# Patient Record
Sex: FEMALE | Race: WHITE | HISPANIC OR LATINO
[De-identification: names, ages, dates, MRNs, and addresses within clinical notes are randomized per-mention and may not be internally consistent; named-entity substitution may affect disease eponyms.]

---

## 2017-01-14 ENCOUNTER — TRANSCRIPTION ENCOUNTER (OUTPATIENT)
Age: 37
End: 2017-01-14

## 2017-04-28 ENCOUNTER — OUTPATIENT (OUTPATIENT)
Dept: OUTPATIENT SERVICES | Facility: HOSPITAL | Age: 37
LOS: 1 days | Discharge: HOME | End: 2017-04-28

## 2017-06-28 DIAGNOSIS — F33.1 MAJOR DEPRESSIVE DISORDER, RECURRENT, MODERATE: ICD-10-CM

## 2017-06-28 DIAGNOSIS — F90.2 ATTENTION-DEFICIT HYPERACTIVITY DISORDER, COMBINED TYPE: ICD-10-CM

## 2018-08-10 ENCOUNTER — OUTPATIENT (OUTPATIENT)
Dept: OUTPATIENT SERVICES | Facility: HOSPITAL | Age: 38
LOS: 1 days | Discharge: HOME | End: 2018-08-10

## 2018-08-10 DIAGNOSIS — D50.0 IRON DEFICIENCY ANEMIA SECONDARY TO BLOOD LOSS (CHRONIC): ICD-10-CM

## 2018-08-10 DIAGNOSIS — R53.83 OTHER FATIGUE: ICD-10-CM

## 2018-08-10 DIAGNOSIS — E78.5 HYPERLIPIDEMIA, UNSPECIFIED: ICD-10-CM

## 2018-08-10 DIAGNOSIS — E13.9 OTHER SPECIFIED DIABETES MELLITUS WITHOUT COMPLICATIONS: ICD-10-CM

## 2018-08-10 DIAGNOSIS — D55.1 ANEMIA DUE TO OTHER DISORDERS OF GLUTATHIONE METABOLISM: ICD-10-CM

## 2018-08-27 ENCOUNTER — OUTPATIENT (OUTPATIENT)
Dept: OUTPATIENT SERVICES | Facility: HOSPITAL | Age: 38
LOS: 1 days | Discharge: HOME | End: 2018-08-27

## 2018-08-27 DIAGNOSIS — C56.9 MALIGNANT NEOPLASM OF UNSPECIFIED OVARY: ICD-10-CM

## 2018-08-27 DIAGNOSIS — R97.1 ELEVATED CANCER ANTIGEN 125 [CA 125]: ICD-10-CM

## 2018-12-17 ENCOUNTER — TRANSCRIPTION ENCOUNTER (OUTPATIENT)
Age: 38
End: 2018-12-17

## 2019-01-27 ENCOUNTER — TRANSCRIPTION ENCOUNTER (OUTPATIENT)
Age: 39
End: 2019-01-27

## 2019-06-19 PROBLEM — Z00.00 ENCOUNTER FOR PREVENTIVE HEALTH EXAMINATION: Status: ACTIVE | Noted: 2019-06-19

## 2019-07-19 ENCOUNTER — APPOINTMENT (OUTPATIENT)
Dept: BREAST CENTER | Facility: CLINIC | Age: 39
End: 2019-07-19
Payer: COMMERCIAL

## 2019-07-19 VITALS
BODY MASS INDEX: 30.73 KG/M2 | SYSTOLIC BLOOD PRESSURE: 118 MMHG | TEMPERATURE: 98.5 F | WEIGHT: 180 LBS | HEIGHT: 64 IN | DIASTOLIC BLOOD PRESSURE: 80 MMHG

## 2019-07-19 DIAGNOSIS — Z86.59 PERSONAL HISTORY OF OTHER MENTAL AND BEHAVIORAL DISORDERS: ICD-10-CM

## 2019-07-19 DIAGNOSIS — R92.8 OTHER ABNORMAL AND INCONCLUSIVE FINDINGS ON DIAGNOSTIC IMAGING OF BREAST: ICD-10-CM

## 2019-07-19 DIAGNOSIS — Z87.09 PERSONAL HISTORY OF OTHER DISEASES OF THE RESPIRATORY SYSTEM: ICD-10-CM

## 2019-07-19 DIAGNOSIS — F17.200 NICOTINE DEPENDENCE, UNSPECIFIED, UNCOMPLICATED: ICD-10-CM

## 2019-07-19 DIAGNOSIS — Z80.41 FAMILY HISTORY OF MALIGNANT NEOPLASM OF OVARY: ICD-10-CM

## 2019-07-19 DIAGNOSIS — N60.82 OTHER BENIGN MAMMARY DYSPLASIAS OF LEFT BREAST: ICD-10-CM

## 2019-07-19 DIAGNOSIS — N60.81 OTHER BENIGN MAMMARY DYSPLASIAS OF RIGHT BREAST: ICD-10-CM

## 2019-07-19 PROCEDURE — 99203 OFFICE O/P NEW LOW 30 MIN: CPT

## 2019-07-21 PROBLEM — Z87.09 HISTORY OF ASTHMA: Status: RESOLVED | Noted: 2019-07-21 | Resolved: 2019-07-21

## 2019-07-21 PROBLEM — Z80.41 FAMILY HISTORY OF OVARIAN CANCER: Status: ACTIVE | Noted: 2019-07-21

## 2019-07-21 PROBLEM — F17.200 CURRENT SMOKER: Status: ACTIVE | Noted: 2019-07-21

## 2019-07-21 PROBLEM — Z86.59 HISTORY OF ANXIETY: Status: RESOLVED | Noted: 2019-07-21 | Resolved: 2019-07-21

## 2019-07-21 RX ORDER — CLONAZEPAM 0.5 MG/1
0.5 TABLET ORAL
Qty: 60 | Refills: 0 | Status: ACTIVE | COMMUNITY
Start: 2019-06-22

## 2019-07-21 RX ORDER — ESCITALOPRAM OXALATE 20 MG/1
20 TABLET ORAL
Qty: 90 | Refills: 0 | Status: ACTIVE | COMMUNITY
Start: 2019-06-22

## 2019-07-21 RX ORDER — ALBUTEROL SULFATE 2.5 MG/3ML
(2.5 MG/3ML) SOLUTION RESPIRATORY (INHALATION)
Refills: 0 | Status: ACTIVE | COMMUNITY

## 2019-08-23 NOTE — PHYSICAL EXAM
[Normocephalic] : normocephalic [Atraumatic] : atraumatic [EOMI] : extra ocular movement intact [No Supraclavicular Adenopathy] : no supraclavicular adenopathy [No Cervical Adenopathy] : no cervical adenopathy [No Nipple Retraction] : no left nipple retraction [No Nipple Discharge] : no left nipple discharge [No Rashes] : no rashes [No Ulceration] : no ulceration [Examined in the supine and seated position] : examined in the supine and seated position [No Axillary Lymphadenopathy] : no right axillary lymphadenopathy [Not Tender] : non-tender [Soft] : abdomen soft [No Edema] : no edema [de-identified] : no suspicious masses palpated in either breast, but she does have several sebaceous cysts on b/l breasts that do not appear infected or inflamed at this time  [de-identified] : three <5mm sebaceous cysts in the 9:00 axis, not infected or inflamed, no induration or erythema; no other suspicious breast masses palpated  [de-identified] : no suspicious masses palpated in her breast, but she has a 5mm sebaceous cyst on the medial portion of her breast

## 2019-08-23 NOTE — HISTORY OF PRESENT ILLNESS
[FreeTextEntry1] : Rosita is a 38 premenopausal F who presents with a recent benign right breast biopsy, left breast pain, and a family history of ovarian cancer. \par \par Starting about 2 months prior, she started to have intermittent pain in her retroareolar left breast.  This prompted the following work up:\par 19 -- b/l dx mammogram \par -scattered areas of fibroglandular density\par -small subcm asymmetry in lateral R breast, correlates as palpated \par -no significant masses, calcifications, or other findings in L breast \par b/l US \par -the palpable area correlates with a 3 mm sebaceous cyst within the dermis of the right breast @9N3, it demonstrates a tract extending to skin surface\par -subjacent to, a hypoechoic lesion is present, measuring 4mm --> complicated cystic lesion vs. nodule, rec BIOPSY \par BIRADS 4\par \par 19 -- R US CNBx @9N9\par -breast tissue with  chronic and granulomatous inflammation \par \par She has no other breast related complaints at this time.  She denies palpating any other masses, denies any signs of redness or pain in the area of her recent biopsy, and denies any nipple discharge or retraction.  \par \par HISTORICAL RISK FACTORS: \par -1 prior breast biopsy as above \par -family history of ovarian cancer in her sister, reportedly BRCA 1/2 negative; no family history of breast cancer \par -, age at first live birth was 25 \par -no prior OCP use \par \par

## 2019-08-23 NOTE — REVIEW OF SYSTEMS
[As Noted in HPI] : as noted in HPI [Skin Wound] : skin wound [Negative] : Heme/Lymph [Skin Lesions] : no skin lesions [Breast Pain] : no breast pain

## 2019-08-23 NOTE — CONSULT LETTER
[Dear  ___] : Dear  [unfilled], [Please see my note below.] : Please see my note below. [Consult Letter:] : I had the pleasure of evaluating your patient, [unfilled]. [Consult Closing:] : Thank you very much for allowing me to participate in the care of this patient.  If you have any questions, please do not hesitate to contact me. [Sincerely,] : Sincerely, [FreeTextEntry2] : Nelida De Los Santos MD\par [address]\[place] [address] [FreeTextEntry3] : Becky Rodriguez MD \par Breast Surgical Oncologist\par [facility] [place]\par [facility]\par [facility]

## 2019-08-23 NOTE — PAST MEDICAL HISTORY
[Menstruating] : The patient is menstruating [Menarche Age ____] : age at menarche was [unfilled] [Total Preg ___] : G[unfilled] [Live Births ___] : P[unfilled]  [Age At Live Birth ___] : Age at live birth: [unfilled] [History of Hormone Replacement Treatment] : has no history of hormone replacement treatment [FreeTextEntry5] : s/p IUD  [FreeTextEntry6] : denies [FreeTextEntry7] : IUD  [FreeTextEntry8] : yes x 3 months

## 2019-08-23 NOTE — DATA REVIEWED
[FreeTextEntry1] : Pathology Report Received From [facility]:\par \par The pathology report of the right breast ultrasound core biopsy dated 6/11/2019 indicated that the target at 9:00 is BENIGN\par \par DIAGNOSIS:\par \par 1. BREAST, RIGHT, 9 O'CLOCK, 9 CM FN: CORE BIOPSY\par - BREAST TISSUE WITH CHRONIC AND GRANULOMATOUS INFLAMMATION.\par \par The pathology results are concordant with the imaging findings. In addition surgical consultation is recommended.\par \par The benign results will be relayed to the patient by the mammography coordinator.\par \par Surgical consultation of the right breast(s) is recommended. _\par \par Electronic Signature: I personally reviewed the images and agree with this report. Final Report: Dictated by and Signed by Attending Ranjana Hummel MD 6/17/2019 2:34 PM\par *******END OF ADDENDUM******\par \par IMPRESSION:\par \par Ultrasound guided needle biopsy of the right breast. Pathology pending.\par \par A final report will be issued when Pathology results are available. _\par \par US BREAST CORE BIOPSY RIGHT, MAMMO DIGITAL POST PROCEDURE RIGHT\par \par HISTORY: Ultrasound of the right breast dated 5/9/2019 revealed a mass at 9:00 o'clock which was recommended for biopsy.\par \par Benefits, risks and alternatives to the procedure were explained to the patient and informed written consent was obtained.\par \par The patient denied taking aspirin or anticoagulants and stated no allergies to topical antiseptic solutions or local anesthetic.\par \par Utilizing universal protocol, site and patient verification was performed prior to starting the procedure.\par \par PROCEDURE: After sterile skin preparation, local anesthesia was achieved with 1% lidocaine. Under ultrasound guidance, a 12G vacuum assisted needle biopsy device was used to obtain multiple core specimens.\par \par After the procedure, a hourglass marking clip was deployed in the area of sampling.\par \par The patient tolerated the procedure well without immediate complications.\par \par POST PROCEDURE MAMMOGRAM FOR MARKER PLACEMENT\par \par A post-procedure mammogram was performed and demonstrates a clip in the appropriate location.\par \par Electronic Signature: I personally reviewed the images and agree with this report. Final Report: Dictated by and Signed by Attending Ranjana Hummel MD 6/11/2019 9:24 AM\par \par OVERALL IMPRESSION: BI-RADS 4: SUSPICIOUS\par 4 mm lesion in the right breast at 9:00 axis at 9 cm from nipple. Ultrasound-guided aspiration/possible biopsy is advised for further evaluation.\par \par The palpable area in the right breast correlates with a 3 mm sebaceous cyst within the dermis of the skin.\par \par \par The findings and recommendations were discussed with the patient.\par \par Biopsy of the right breast(s) is recommended. A letter will be sent to the patient to return for a biopsy.\par \par MAMMO TOMOSYNTHESIS DIAGNOSTIC BILATERAL, US BREAST\par \par Clinical Breast Exam: Patient does report clinical breast exam in the last year.\par \par Clinical Indication: No family history of breast cancer.\par \par Compared to: 03/27/2017\par \par MAMMOGRAM:\par \par Tomosynthesis and 2D imaging of the breast(s) were performed. Current study was also evaluated with a computer aided detection (CAD) system.\par \par Breast Density: There are scattered areas of fibroglandular density.\par \par There is a small subcentimeter asymmetry in the lateral aspect of the right breast which correlates as palpated. No significant masses, calcifications, or other findings are seen in the left breast.\par \par US BREAST LIMITED BILATERAL\par \par Color flow, Gray scale and real-time ultrasound of both breasts was performed and targeted to the area(s) of interest.\par \par The palpable area correlates with a 3 mm sebaceous cyst within the dermis of the right breast at 9:00 axis at 9 cm from nipple. It demonstrates a tract extending to the skin surface. Subjacent to, is a hypoechoic lesion measuring 4 mm. This is not palpable. This may represents a complicated cystic lesion versus nodule and is indeterminate.\par No suspicious abnormalities were seen sonographically in the left breast.\par \par Electronic Signature: I personally reviewed the images and agree with this report. Final Report: Dictated by and Signed by Attending Radha Martinez MD 5/9/2019 10:31 AM\par

## 2019-08-23 NOTE — ASSESSMENT
[FreeTextEntry1] : Rosita is a 38 premenopausal F with bilateral sebaceous cysts on her breasts, breast pain, and a recent benign R biopsy. \par \par ON exam, she had bilateral sebaceous cysts that were not infected or inflamed.  I was not able to palpate any suspicious abnormalities in either breast.  Her most recent mammogram on 5/9/19 revealed a sebaceous cyst in her right breast with an adjacent hypoechoic mass @9N9 that was biopsy proven chronic granulomatous inflammation.  She will be due for a R breast US on 12/11/19.  This will be scheduled for her today. \par \par In regards to her sebaceous cysts, I have explained that these are benign skin lesions that can occur anywhere on her skin where she has sebaceous glands.  They can get infected or inflamed, and could be an indication for surgical excision if recurrent infections occur.  At this time, she is asymptomatic from her sebaceous cysts and is not interested in surgical excision. \par \par In regards to her breast pain, it may be related to fibrocystic changes within her breast that are hormonally influenced. We spoke about possible interventions including evening primrose oil, supportive bras, and decreasing caffeine intake.  Although none of these have been consistently proven to improve breast pain, they may be tried.  If the pain becomes very severe, there have been studies of tamoxifen being effective for the treatment of breast pain, although there are risks with tamoxifen.  At this time she will try supportive measures.\par \par In regards to her family history of ovarian cancer in her sister at age 30, she qualifies for genetic testing, although given the fact that her sister was negative for BRCA 1/2, she in unlikely to test positive for a BRCA mutation.  [facility] panel testing was performed today. \par \par All of her questions were answered.  She knows to call with any further questions or concerns. \par \par PLAN: \par -genetic testing -- [facility], blood drawn today \par -f/up in 1month to discuss results \par -R breast US 12/2019, f/up after repeat imaging

## 2019-12-19 ENCOUNTER — OUTPATIENT (OUTPATIENT)
Dept: OUTPATIENT SERVICES | Facility: HOSPITAL | Age: 39
LOS: 1 days | Discharge: HOME | End: 2019-12-19

## 2019-12-19 ENCOUNTER — APPOINTMENT (OUTPATIENT)
Dept: BREAST CENTER | Facility: CLINIC | Age: 39
End: 2019-12-19

## 2019-12-19 DIAGNOSIS — R92.8 OTHER ABNORMAL AND INCONCLUSIVE FINDINGS ON DIAGNOSTIC IMAGING OF BREAST: ICD-10-CM

## 2020-02-07 ENCOUNTER — APPOINTMENT (OUTPATIENT)
Dept: BREAST CENTER | Facility: CLINIC | Age: 40
End: 2020-02-07

## 2023-08-02 ENCOUNTER — APPOINTMENT (OUTPATIENT)
Dept: PLASTIC SURGERY | Facility: CLINIC | Age: 43
End: 2023-08-02
Payer: COMMERCIAL

## 2023-08-02 VITALS — BODY MASS INDEX: 37.39 KG/M2 | WEIGHT: 219 LBS | HEIGHT: 64 IN

## 2023-08-02 PROCEDURE — 99203 OFFICE O/P NEW LOW 30 MIN: CPT

## 2023-08-02 RX ORDER — SERTRALINE 25 MG/1
TABLET, FILM COATED ORAL
Refills: 0 | Status: ACTIVE | COMMUNITY

## 2023-08-02 RX ORDER — SIMVASTATIN 80 MG/1
TABLET, FILM COATED ORAL
Refills: 0 | Status: ACTIVE | COMMUNITY

## 2023-08-02 NOTE — HISTORY OF PRESENT ILLNESS
[FreeTextEntry1] : 42F with pmh anxiety, asthma, HLD presents with symptomatic macromastia. She says that her breasts have always been large but that she is now having back/neck pain. She states that she can only wear sports bras as underwire bras cause too much pain and irritation. Even with the sports bra she has frequent rashes and irritation. Uses baby powder and cortisone cream OTC without improvement in rashes. Would like to discuss breast reduction.  Last mammogram was last year.  PSH: tubal ligation, right breast biopsy 2019 Social: smokes 0.5 ppd, denies vaping  Wears size 40DDD  as above significatn sxs from macromastia smokes 1/2ppd over past few years over 40 lbs weight gain pt does not exercise, stress, and eating more has led to the weight gain  expplained in detail cannot do BR if still smoking and her risk profile would be improved if can lose weight  she understands and will contact PMD for assistance in smoking cessation and will also work on weight loss  all ?s answered  Except for the above two issues, patient is a good candidate for breast reduction.  Regarding the procedure, we discussed scarring, poor wound healing, keloid and/or hypertrophic scarring, diminished nipple sensation, diminished ability to breast feed (if appropriate), breast asymmetry, dissatisfaction with the outcome, need for additional surgery and possible nipple loss.  All questions were answered and all risks were understood.  f/u 3 months  will take photos at that visit  all ?s asnwered

## 2023-11-09 ENCOUNTER — APPOINTMENT (OUTPATIENT)
Dept: PLASTIC SURGERY | Facility: CLINIC | Age: 43
End: 2023-11-09
Payer: COMMERCIAL

## 2023-11-09 VITALS — BODY MASS INDEX: 37.39 KG/M2 | WEIGHT: 219 LBS | HEIGHT: 64 IN

## 2023-11-09 DIAGNOSIS — N62 HYPERTROPHY OF BREAST: ICD-10-CM

## 2023-11-09 PROCEDURE — 99212 OFFICE O/P EST SF 10 MIN: CPT

## 2024-07-15 ENCOUNTER — APPOINTMENT (OUTPATIENT)
Dept: PLASTIC SURGERY | Facility: CLINIC | Age: 44
End: 2024-07-15